# Patient Record
Sex: FEMALE | Race: WHITE
[De-identification: names, ages, dates, MRNs, and addresses within clinical notes are randomized per-mention and may not be internally consistent; named-entity substitution may affect disease eponyms.]

---

## 2017-09-12 ENCOUNTER — HOSPITAL ENCOUNTER (OUTPATIENT)
Dept: HOSPITAL 75 - RAD | Age: 67
End: 2017-09-12
Attending: NURSE PRACTITIONER
Payer: MEDICARE

## 2017-09-12 DIAGNOSIS — Z12.31: Primary | ICD-10-CM

## 2017-09-12 PROCEDURE — 77067 SCR MAMMO BI INCL CAD: CPT

## 2017-09-13 NOTE — DIAGNOSTIC IMAGING REPORT
EXAMINATION:

Bilateral screening mammogram 2D views with tomosynthesis. The

current study was also evaluated with a Computer Aided Detection

(CAD) system.



INDICATION: 

Screening.



PERSONAL HISTORY: 

No current complaints stated on the questionnaire.



COMPARISON: 

06/16/2016.



FINDINGS:

The breasts are composed of scattered fibroglandular densities.

There are scattered punctate calcifications seen. Allowing for

technique and positional differences, no suspicious change is

seen.



IMPRESSION: 

No significant change.



ACR BI-RADS Category 2: Benign findings.

Result letter will be mailed to the patient.

Note: At least 10% of breast cancer is not imaged by mammography.



 



Dictated by: 



  Dictated on workstation # [id number]

## 2017-11-15 ENCOUNTER — HOSPITAL ENCOUNTER (OUTPATIENT)
Dept: HOSPITAL 75 - RAD | Age: 67
End: 2017-11-15
Attending: NURSE PRACTITIONER
Payer: MEDICARE

## 2017-11-15 DIAGNOSIS — M17.11: Primary | ICD-10-CM

## 2017-11-15 PROCEDURE — 73562 X-RAY EXAM OF KNEE 3: CPT

## 2017-11-15 NOTE — DIAGNOSTIC IMAGING REPORT
EXAMINATION:

Three views of the right knee.



INDICATION:  

Right knee pain.



FINDINGS:

No fracture, dislocation, or radiopaque foreign body. There are

mild medial and patellofemoral compartment osteophytes. There is

subchondral stenosis and cyst formation at the patellofemoral

compartment also suggested. No effusion is seen.



IMPRESSION: 

Degenerative changes, most prominent in the patellofemoral

compartment.



Dictated by: 



  Dictated on workstation # [id number]

## 2018-01-18 ENCOUNTER — HOSPITAL ENCOUNTER (OUTPATIENT)
Dept: HOSPITAL 75 - REHAB | Age: 68
Discharge: HOME | End: 2018-01-18
Attending: FAMILY MEDICINE
Payer: MEDICARE

## 2018-01-18 DIAGNOSIS — M25.561: Primary | ICD-10-CM

## 2018-07-27 ENCOUNTER — HOSPITAL ENCOUNTER (OUTPATIENT)
Dept: HOSPITAL 75 - RAD | Age: 68
End: 2018-07-27
Attending: NURSE PRACTITIONER
Payer: MEDICARE

## 2018-07-27 DIAGNOSIS — G89.29: ICD-10-CM

## 2018-07-27 DIAGNOSIS — M25.562: Primary | ICD-10-CM

## 2018-07-27 PROCEDURE — 73562 X-RAY EXAM OF KNEE 3: CPT

## 2018-07-27 NOTE — DIAGNOSTIC IMAGING REPORT
INDICATION: Chronic knee pain of six months duration.



No priors.



FINDINGS: There is no fracture, dislocation or acute articular

incongruity. No acute periosteal reaction or bony destruction.



IMPRESSION: No acute-appearing abnormality.



Dictated by: 



  Dictated on workstation # [id number]

## 2018-08-03 ENCOUNTER — HOSPITAL ENCOUNTER (OUTPATIENT)
Dept: HOSPITAL 75 - RAD | Age: 68
End: 2018-08-03
Attending: NURSE PRACTITIONER
Payer: MEDICARE

## 2018-08-03 DIAGNOSIS — M71.22: Primary | ICD-10-CM

## 2018-08-03 PROCEDURE — 76881 US COMPL JOINT R-T W/IMG: CPT

## 2018-08-03 NOTE — DIAGNOSTIC IMAGING REPORT
INDICATION: Left knee pain.



FINDINGS: Sonographic interrogation of the popliteal fossa on the

left was performed. There is a complex Baker's cyst measuring 4.2

x 2.3 x 7.6 cm. No internal vascularity is seen. The popliteal

artery and vein are patent.



IMPRESSION: Complex Baker's cyst.



Dictated by: 



  Dictated on workstation # [id number]

## 2018-12-20 ENCOUNTER — HOSPITAL ENCOUNTER (OUTPATIENT)
Dept: HOSPITAL 75 - RAD | Age: 68
End: 2018-12-20
Attending: FAMILY MEDICINE
Payer: MEDICARE

## 2018-12-20 DIAGNOSIS — Z12.31: Primary | ICD-10-CM

## 2018-12-20 PROCEDURE — 77067 SCR MAMMO BI INCL CAD: CPT

## 2018-12-20 NOTE — DIAGNOSTIC IMAGING REPORT
INDICATION: Routine screening.



COMPARISON: Comparison is made with prior mammograms from

09/12/2017 and 06/16/2016.



TECHNIQUE: 2D and 3D bilateral screening mammography was

performed with computer-aided detection (CAD) system.



FINDINGS: Scattered fibroglandular densities are identified

bilaterally. No dominant mass or malignant-appearing

microcalcifications are seen. The axillae are unremarkable.



IMPRESSION: No mammographic features suspicious for malignancy

are identified.



ACR BI-RADS Category 1: Negative.

Result letter will be mailed to the patient.

Note:  At least 10% of breast cancer is not imaged by

mammography.



Dictated by: 



  Dictated on workstation # [id number]

## 2019-12-23 ENCOUNTER — HOSPITAL ENCOUNTER (OUTPATIENT)
Dept: HOSPITAL 75 - RAD | Age: 69
End: 2019-12-23
Attending: NURSE PRACTITIONER
Payer: MEDICARE

## 2019-12-23 DIAGNOSIS — Z12.31: Primary | ICD-10-CM

## 2019-12-23 PROCEDURE — 77067 SCR MAMMO BI INCL CAD: CPT

## 2019-12-23 NOTE — DIAGNOSTIC IMAGING REPORT
INDICATION: Routine screening.



Comparison is made with prior mammogram from 12/20/2018 and

9/12/2017.



2-D and 3-D bilateral screening mammography was performed with a

Computer Aided Detection (CAD) system. 3-D tomosynthesis was also

performed and reviewed.



FINDINGS: Scattered fibroglandular densities are identified

bilaterally. The parenchymal pattern is stable. No mass or

malignant appearing microcalcifications are seen. Axillae are

unremarkable.



IMPRESSION: No mammographic features suspicious for malignancy

are identified.



ACR BI-RADS Category 1: Negative.

Result letter will be mailed to the patient.

Note:  At least 10% of breast cancer is not imaged by

mammography.



Dictated by: 



  Dictated on workstation # [id number]

## 2020-06-25 ENCOUNTER — HOSPITAL ENCOUNTER (OUTPATIENT)
Dept: HOSPITAL 75 - RAD | Age: 70
End: 2020-06-25
Attending: NURSE PRACTITIONER
Payer: MEDICARE

## 2020-06-25 DIAGNOSIS — E04.2: Primary | ICD-10-CM

## 2020-06-25 PROCEDURE — 76536 US EXAM OF HEAD AND NECK: CPT

## 2020-06-25 NOTE — DIAGNOSTIC IMAGING REPORT
PROCEDURE: US Thyroid.



TECHNIQUE: Multiple real-time grayscale images were obtained of

the thyroid in various projections.



INDICATION:  Thyroid nodule



There are no prior thyroid ultrasound examinations available for

comparison. The nuclear medicine thyroid scan performed on

05/09/2012 failed to show any abnormality of the thyroid gland.



On this study the thyroid gland is not enlarged. The right lobe

measures 4.8 x 1.8 x 1.1 cm, while the left lobe is estimated to

be 4.8 x 1.5 x 1.6 cm. (Normal gland size 4/5 x 2.2 cm or less).

There is a 1.1 x 1.0 x 0.7 cm nodule of mixed echogenicity in the

midportion of the right lobe of the thyroid. I would rate this as

a TI-RADS 2 or perhaps TI-RADS 3. At any rate I do not feel it is

suspicious. Even so, as there are no prior studies available for

comparison, I would recommend that a short-term (6 month)

follow-up thyroid ultrasound exam be obtained for further study.



There is also predominantly solid 1.2 x 1.0 x 0.8 cm nodule in

the inferior pole of the left lobe. This does show some increased

vascularity. I would rate this as  a TI-RADS 3 as well. This

finding should also be further evaluated with a six-month

follow-up ultrasound exam.



There are a few other subcentimeter nodules in both lungs. These

are generally unremarkable. 



IMPRESSION:

1. The thyroid gland is not enlarged but there are small nodules

in both lobes. These nodules are probably TI-RADS 2 or TI-RADS  3

and unlikely to be related to malignancy. Even so, a short-term

(6 month) follow-up ultrasound exam would be recommended.



Dictated by: 



  Dictated on workstation # [id number]

## 2020-11-10 ENCOUNTER — HOSPITAL ENCOUNTER (OUTPATIENT)
Dept: HOSPITAL 75 - PREOP | Age: 70
Discharge: HOME | End: 2020-11-10
Attending: SPECIALIST
Payer: MEDICARE

## 2020-11-10 VITALS — HEIGHT: 66.02 IN | BODY MASS INDEX: 41.88 KG/M2 | WEIGHT: 260.59 LBS

## 2020-11-10 DIAGNOSIS — Z01.818: Primary | ICD-10-CM

## 2020-11-13 ENCOUNTER — HOSPITAL ENCOUNTER (OUTPATIENT)
Dept: HOSPITAL 75 - SDC | Age: 70
Discharge: HOME | End: 2020-11-13
Attending: SPECIALIST
Payer: MEDICARE

## 2020-11-13 VITALS — HEIGHT: 66.02 IN | BODY MASS INDEX: 41.88 KG/M2 | WEIGHT: 260.59 LBS

## 2020-11-13 VITALS — SYSTOLIC BLOOD PRESSURE: 135 MMHG | DIASTOLIC BLOOD PRESSURE: 75 MMHG

## 2020-11-13 VITALS — DIASTOLIC BLOOD PRESSURE: 75 MMHG | SYSTOLIC BLOOD PRESSURE: 135 MMHG

## 2020-11-13 DIAGNOSIS — E78.00: ICD-10-CM

## 2020-11-13 DIAGNOSIS — Z79.899: ICD-10-CM

## 2020-11-13 DIAGNOSIS — Z80.9: ICD-10-CM

## 2020-11-13 DIAGNOSIS — H35.30: ICD-10-CM

## 2020-11-13 DIAGNOSIS — Z90.710: ICD-10-CM

## 2020-11-13 DIAGNOSIS — H26.493: Primary | ICD-10-CM

## 2020-11-13 RX ADMIN — TETRACAINE HYDROCHLORIDE PRN ML: 5 SOLUTION OPHTHALMIC at 07:47

## 2020-11-13 RX ADMIN — TETRACAINE HYDROCHLORIDE PRN ML: 5 SOLUTION OPHTHALMIC at 07:35

## 2020-11-13 NOTE — OPHTHALMOLOGIST PRE-OP NOTE
Pre-Operative Progress Note


H&P Reviewed


The H&P was reviewed, patient examined and no changes noted.


Date H&P Reviewed:  Nov 13, 2020


Time H&P Reviewed:  07:08


Pre-Op Dx


Secondary Cataract, Bilateral Eyes











ANNA PEARSON MD             Nov 13, 2020 07:08

## 2020-11-13 NOTE — OPHTHALMOLOGY OPERATIVE REPORT
YAG Capsulotomy


PREOPERATIVE DIAGNOSIS:    Secondary Cataract Bilateral


POSTOPERATIVE DIAGNOSIS: Secondary Cataract Bilateral





PROCEDURE: YAG Capsulotomy, Bilateral





SURGEON: Vicente Pearson 





ANESTHESIA: Topical anesthesia





COMPLICATIONS: None





ESTIMATED BLOOD LOSS: Minimal 





DESCRIPTION OF PROCEDURE:


After proper informed consent was obtained, the patient's, a 69 female , 

received one drop of Tropicamide and one drop of Tetracaine in each eye. The 

patient was then placed at the YAG laser and using a power of [ 3.0] millijoules

and bursts  17[ ] right eye and [ 14] left eye were used to fashion a central 

capsulotomy. The patient tolerated the procedure well without complications.











VICENTE PEARSON MD             Nov 13, 2020 08:34

## 2020-12-22 ENCOUNTER — HOSPITAL ENCOUNTER (OUTPATIENT)
Dept: HOSPITAL 75 - RAD | Age: 70
End: 2020-12-22
Attending: FAMILY MEDICINE
Payer: MEDICARE

## 2020-12-22 DIAGNOSIS — N95.9: ICD-10-CM

## 2020-12-22 DIAGNOSIS — E04.2: Primary | ICD-10-CM

## 2020-12-22 PROCEDURE — 77080 DXA BONE DENSITY AXIAL: CPT

## 2020-12-22 PROCEDURE — 76536 US EXAM OF HEAD AND NECK: CPT

## 2020-12-22 NOTE — DIAGNOSTIC IMAGING REPORT
INDICATION: Postmenopausal screening



COMPARISON: Baseline



FINDINGS:



AP Spine L1-L4:  

[BMD (g/cm2): 1.501] [T-Score: 2.5] [Z-Score: 3.0]

[BMD Previous: NA] [BMD % Change: NA]



LT Hip Neck:       

[BMD (g/cm2): 0.953] [T-Score: -0.6] [Z-Score: 0.4]



LT Hip Total:       

[BMD (g/cm2):1.069] [T-Score:0.5] [Z-Score: 1.2]

[BMD Previous: NA] [BMD % Change: NA]



RT Hip Neck:      

[BMD (g/cm2):0.950] [T-Score:-0.6] [Z-Score:0.4]



RT Hip Total:      

[BMD (g/cm2):1.092] [T-score:0.7] [Z-Score:1.4]

[BMD Previous:NA] [BMD % Change:NA]



*Indicates significant change from prior examination based on 95%

confidence level.



World Health Organization criteria for BMD interpretation

classify patients as Normal (T-score at or above -1.0),

Osteopenic (T-score between -1.0 and -2.5) or Osteoporotic

(T-score at or below -2.5).



LIMITATIONS AND MODIFICATION:  None.



FRACTURE RISK (FRAX SCORE):

The ten year probability of (%): 

Major Osteoporotic Fracture: [NA]

Hip Fracture: [NA]



IMPRESSION:

1. Normal bone mineral density. 

2. Baseline examination.

3. See below National Osteoporosis Foundation guidelines on when

to potentially initiate pharmacologic therapy. 



Based on the National Osteoporosis Foundation Guidelines,

pharmacologic treatment should be initiated in any of the

following, unless clinical conditions suggest otherwise:



*  Any patient with prior fragility fracture of the hip or

vertebrae. A spine fracture indicates 5X risk for subsequent

spine fracture and 2X risk for subsequent hip fracture.



*  Osteoporosis (T-score <-2.5).



*  Postmenopausal women and men age 50 and older with low bone

mass/osteopenia (T-score between -1.0 and -2.5) by DXA and

10-year major osteoporotic fracture greater than 20% or a 10-year

probability of hip fracture greater than 3%. These fracture risks

are supplied above in the FRAX score, if applicable.



*  Clinician judgement and/or patient preferences may indicate

treatment for people with 10-year fracture probabilities above or

below these levels.



Dictated by: 



  Dictated on workstation # [id number]

## 2020-12-22 NOTE — DIAGNOSTIC IMAGING REPORT
PROCEDURE: US Thyroid.



TECHNIQUE: Multiple real-time grayscale images were obtained of

the thyroid in various projections.



INDICATION: Thyroid nodule.



COMPARISON: Thyroid ultrasound of 06/25/2020.



FINDINGS:



Right thyroid lobe: The right thyroid lobe measures 4.8 x 1.2 x

2.0 cm. There are few simple benign cysts within the right

thyroid lobe that are unchanged. There is also a mixed solid and

cystic nodule in the right thyroid lobe measuring 0.9 x 0.6 x 1.0

cm (previously 1.1 x 0.9 x 0.7 cm). There are no echogenic foci

and the nodule is wider than tall with smooth margins.



Isthmus: The thyroid isthmus measures 0.3 cm and is without

nodule. 



Left thyroid lobe: The left thyroid lobe measures 4.8 x 1.4 x 1.3

cm. There is a mixed solid and cystic nodule in the left thyroid

lobe measuring 0.9 x 0.9 x 0.8 cm. This is also wider than tall

and has no internal echogenic foci.



IMPRESSION:

1. Stable bilateral thyroid nodules which are only mildly

suspicious based on TI-RADS classification (TI-RADS 3). As

detailed below, given small size, follow-up thyroid ultrasound in

12 months is suggested to document stability.



TI-RADS Recommendations:TR3 - Mildly Suspicious. FNA if > 2.5 cm.

Follow if > 1.5 cm at  1, 3, 5 years. 



Dictated by: 



  Dictated on workstation # [id number]

## 2020-12-28 ENCOUNTER — HOSPITAL ENCOUNTER (OUTPATIENT)
Dept: HOSPITAL 75 - RAD | Age: 70
End: 2020-12-28
Attending: FAMILY MEDICINE
Payer: MEDICARE

## 2020-12-28 DIAGNOSIS — Z12.31: Primary | ICD-10-CM

## 2020-12-28 DIAGNOSIS — N95.9: ICD-10-CM

## 2020-12-28 DIAGNOSIS — E04.1: ICD-10-CM

## 2020-12-28 PROCEDURE — 77067 SCR MAMMO BI INCL CAD: CPT

## 2020-12-28 PROCEDURE — 77063 BREAST TOMOSYNTHESIS BI: CPT

## 2020-12-28 NOTE — DIAGNOSTIC IMAGING REPORT
INDICATION: 

Routine screening.



COMPARISON:    

12/23/2019 and 12/20/2018.



TECHNIQUE: 

2D and 3D bilateral screening mammography was performed with CAD.



FINDINGS:

Scattered fibroglandular densities are identified bilaterally.

The parenchymal pattern is stable. No mass or malignant appearing

microcalcifications are seen. The axillae are unremarkable.



IMPRESSION: 

No mammographic features suspicious for malignancy are

identified.



ACR BI-RADS Category 1: Negative.

Result letter will be mailed to the patient.

Note:  At least 10% of breast cancer is not imaged by

mammography.



Dictated by: 



  Dictated on workstation # [id number]

## 2021-06-25 ENCOUNTER — HOSPITAL ENCOUNTER (OUTPATIENT)
Dept: HOSPITAL 75 - PREOP | Age: 71
LOS: 3 days | Discharge: HOME | End: 2021-06-28
Attending: INTERNAL MEDICINE
Payer: MEDICARE

## 2021-06-25 VITALS — HEIGHT: 65.98 IN | WEIGHT: 262.13 LBS | BODY MASS INDEX: 42.13 KG/M2

## 2021-06-25 DIAGNOSIS — Z01.818: Primary | ICD-10-CM

## 2021-06-25 NOTE — HISTORY AND PHYSICAL
DATE OF SERVICE:  



COLONOSCOPY HISTORY AND PHYSICAL



HISTORY OF PRESENT ILLNESS:

The patient is a 70-year-old white female referred by Dr. Myers for screening

colonoscopy.  She is deemed to be of higher than average risk as her mother was

diagnosed with colon cancer in her 70s.  The patient last underwent colonoscopy

a little over 6-1/2 years ago, at which time she had three tubular adenomas

removed, one from the proximal sigmoid colon, one from the cecum and one from

the mid ascending colon.  She reports no change in health history.  No new

surgeries since I last saw her.  She has noted no bright red blood per rectum or

melena.  She has had no bowel habit change and feeling like she is in her usual

state of health.



PAST MEDICAL HISTORY:

Significant for hypertension and hyperlipidemia with no known history of

vascular disease.



PAST SURGICAL HISTORY:

Adenoidectomy and tonsillectomy as a child, hysterectomy, cholecystectomy and

appendectomy, none in the recent past.



SOCIAL HISTORY:

She is , retired in 2017.  No smoking or alcohol intake.



FAMILY HISTORY:

Other than mother with colon cancer.  Pertinent for father who succumbed to lung

cancer, also had hypertension and arthritis.  He has had one brother diagnosed

with prostate cancer in his 70s.



REVIEW OF SYSTEMS:

CONSTITUTIONAL:  The patient denies change in weight, night sweats, chills or

fever.

PULMONARY:  Denies cough, dyspnea on exertion or wheezing.

CARDIOVASCULAR:  Denies orthopnea, PND, pedal edema, chest discomfort, syncope

or presyncope.

GASTROINTESTINAL:  As noted in the HPI.



PHYSICAL EXAMINATION:

GENERAL:  Reveals a pleasant overweight white female, appears to be in no acute

distress.

VITAL SIGNS:  Weight 262 pounds, blood pressure 130/80.

HEENT:  Unremarkable.  Sclerae nonicteric.

CHEST:  Clear to auscultation.

CARDIOVASCULAR:  Reveals a regular rate and rhythm without significant murmur,

S3 or S4.

ABDOMEN:  Soft, supple without mass, organomegaly or tenderness.

EXTREMITIES:  Reveal no cyanosis, clubbing or edema.



ASSESSMENT AND PLAN:

The patient is set up for screening colonoscopy, deemed to be of higher than

average risk due to past history of polyps and family history for

colon cancer, index case being her mother.  Prep instructions with Suprep kit

were given and questions were answered.



Thank you for the referral of this pleasant lady.





Job ID: [id number]

DocumentID: [id number]

Dictated Date:  06/24/2021 14:19:24

Transcription Date: 06/24/2021 14:43:13

Dictated By: DARRIAN BAILEY MD

MTDD

## 2021-07-29 ENCOUNTER — HOSPITAL ENCOUNTER (OUTPATIENT)
Dept: HOSPITAL 75 - PREOP | Age: 71
Discharge: HOME | End: 2021-07-29
Attending: INTERNAL MEDICINE
Payer: MEDICARE

## 2021-07-29 VITALS — WEIGHT: 258.16 LBS | BODY MASS INDEX: 41.49 KG/M2 | HEIGHT: 65.98 IN

## 2021-07-29 DIAGNOSIS — Z01.818: Primary | ICD-10-CM

## 2021-08-06 ENCOUNTER — HOSPITAL ENCOUNTER (OUTPATIENT)
Dept: HOSPITAL 75 - ENDO | Age: 71
End: 2021-08-06
Attending: INTERNAL MEDICINE
Payer: MEDICARE

## 2021-08-06 VITALS — BODY MASS INDEX: 41.45 KG/M2 | HEIGHT: 65.98 IN | WEIGHT: 257.94 LBS

## 2021-08-06 VITALS — DIASTOLIC BLOOD PRESSURE: 73 MMHG | SYSTOLIC BLOOD PRESSURE: 145 MMHG

## 2021-08-06 VITALS — DIASTOLIC BLOOD PRESSURE: 75 MMHG | SYSTOLIC BLOOD PRESSURE: 143 MMHG

## 2021-08-06 VITALS — SYSTOLIC BLOOD PRESSURE: 149 MMHG | DIASTOLIC BLOOD PRESSURE: 68 MMHG

## 2021-08-06 VITALS — SYSTOLIC BLOOD PRESSURE: 141 MMHG | DIASTOLIC BLOOD PRESSURE: 74 MMHG

## 2021-08-06 DIAGNOSIS — E78.5: ICD-10-CM

## 2021-08-06 DIAGNOSIS — Z79.899: ICD-10-CM

## 2021-08-06 DIAGNOSIS — Z12.11: Primary | ICD-10-CM

## 2021-08-06 DIAGNOSIS — E66.01: ICD-10-CM

## 2021-08-06 DIAGNOSIS — K21.9: ICD-10-CM

## 2021-08-06 DIAGNOSIS — D12.3: ICD-10-CM

## 2021-08-06 DIAGNOSIS — I10: ICD-10-CM

## 2021-08-06 PROCEDURE — 88305 TISSUE EXAM BY PATHOLOGIST: CPT

## 2021-08-06 RX ADMIN — SODIUM CHLORIDE, SODIUM LACTATE, POTASSIUM CHLORIDE, AND CALCIUM CHLORIDE STA MLS/HR: 600; 310; 30; 20 INJECTION, SOLUTION INTRAVENOUS at 08:08

## 2021-08-06 RX ADMIN — SODIUM CHLORIDE, SODIUM LACTATE, POTASSIUM CHLORIDE, AND CALCIUM CHLORIDE STA MLS/HR: 600; 310; 30; 20 INJECTION, SOLUTION INTRAVENOUS at 08:17

## 2021-08-06 NOTE — ANESTHESIA-GENERAL POST-OP
MAC


Patient Condition


Mental Status/LOC:  Same as Preop


Cardiovascular:  Satisfactory


Nausea/Vomiting:  Absent


Respiratory:  Satisfactory


Pain:  Controlled


Complications:  Absent





Post Op Complications


Complications


None





Follow Up Care/Instructions


Patient Instructions


None needed.





Anesthesiology Discharge Order


Discharge Order


Patient is doing well, no complaints, stable vital signs, no apparent adverse 

anesthesia problems.   


No complications reported per nursing.











GT BARROSO CRNA           Aug 6, 2021 09:43

## 2021-08-06 NOTE — OPERATIVE REPORT
DATE OF SERVICE:  



COLONOSCOPY SUMMARY



INDICATION FOR THE PROCEDURE:

Screening colonoscopy.



DESCRIPTION OF PROCEDURE:

The patient was placed in the left lateral decubitus position.  Prior to

undergoing colonoscopy, digital rectal evaluation was performed.  Anal sphincter

tone was normal and the perianal reflexes intact.  No abnormalities were noted

on digital inspection of anal canal or distal rectal vault.  The colonoscope was

then inserted into the rectum and under direct visualization advanced to the

cecum.  The cecum was identified by identification of ileocecal valve and cecal

strap.  Photographic documentation was obtained.  A careful inspection was made

as the colonoscope was withdrawn.  Quality of prep was good.



FINDINGS:

There was no evidence for internal or external hemorrhoids.  The rectum, sigmoid

colon, descending colon, splenic flexure and transverse colon were unremarkable.

 One 3 mm sessile polyp was noted at the hepatic flexure.  It was biopsy removed

via cold forceps and submitted for histopathology with no blood loss.  Present

in the mid ascending colon was blush lesions compatible with vascular

malformation, no evidence for blood was noted in the colon.  The remainder of

the ascending colon and cecum was unremarkable.



ASSESSMENT:

One diminutive 3 mm polyp was removed noted at hepatic flexure with no

subsequent blood loss.  There was one small lesion compatible with vascular

malformation noted in the mid ascending colon.  This was an otherwise normal

colonoscopy to the cecum under good prep conditions.  As long as there are no

surprise on histopathology report and due to family history would advocate

consideration for repeat screening colonoscopy in 5 years.



I thank you for the referral of this pleasant lady.





Job ID: [id number]

DocumentID: [id number]

Dictated Date:  08/06/2021 09:44:04

Transcription Date: 08/06/2021 14:01:21

Dictated By: DARRIAN BAILEY MD

## 2021-08-06 NOTE — PRE-OP NOTE & CONSCIOUS SEDAT
Pre-Operative Progress Note


H&P Reviewed


The H&P was reviewed, patient examined and no changes noted.


Date H&P Reviewed:  Aug 6, 2021


Time H&P Reviewed:  08:25





Conscious Sedation Pre-Proced


ASA Score


2


For ASA 3 and 4: Consider anesthesia and medical clearance. Also, for patients

with a history of failed moderate sedation consider anesthesia.

















Airway 


 


Lungs 


 


Heart 


 


 ASA score


 


 ASA 1: a normal healthy patient


 


 ASA 2:  a patient with a mild systemic disease (mid diabetes, controlled 

hypertension, obesity 


 


 ASA 3:  a patient with a severe systemic disease that limits activity  (angina,

COPD, prior Myocardial infarction)


 


 ASA 4:  a patient with an incapacitating disease that is a constant threat to 

life (CHF, renal failure)


 


 ASA 5:  a moribund patient not expected to survive 24 hrs.  (ruptured aneurysm)


 


 ASA 6:  a declared brain-dead patient whose organs are being harvested.


 


 For emergent operations, add the letter E after the classification











Mallampati Classification


Grade 2





Sedation Plan


Analgesia, Amnesia, Plan communicated to team members, Discussed options with 

patient/fam, Discussed risks with patient/fam


The patient is an appropriate candidate to undergo the planned procedure, 

sedation, and anesthesia.





The patient immediately re-assessed prior to indication.











DARRIAN BAILEY MD               Aug 6, 2021 08:25

## 2021-08-06 NOTE — PRE-OP NOTE & CONSCIOUS SEDAT
Pre-Operative Progress Note


H&P Reviewed


The H&P was reviewed, patient examined and no changes noted.


Date H&P Reviewed:  Aug 6, 2021


Time H&P Reviewed:  08:25





Conscious Sedation Pre-Proced


Time


08:25





ASA Score


2


For ASA 3 and 4: Consider anesthesia and medical clearance. Also, for patients

with a history of failed moderate sedation consider anesthesia.

















Airway 


 


Lungs 


 


Heart 


 


 ASA score


 


 ASA 1: a normal healthy patient


 


 ASA 2:  a patient with a mild systemic disease (mid diabetes, controlled 

hypertension, obesity 


 


 ASA 3:  a patient with a severe systemic disease that limits activity  (angina,

COPD, prior Myocardial infarction)


 


 ASA 4:  a patient with an incapacitating disease that is a constant threat to 

life (CHF, renal failure)


 


 ASA 5:  a moribund patient not expected to survive 24 hrs.  (ruptured aneurysm)


 


 ASA 6:  a declared brain-dead patient whose organs are being harvested.


 


 For emergent operations, add the letter E after the classification











Mallampati Classification


Grade 2





Sedation Plan


Analgesia, Amnesia, Plan communicated to team members, Discussed options with 

patient/fam, Discussed risks with patient/fam


The patient is an appropriate candidate to undergo the planned procedure, 

sedation, and anesthesia.





The patient immediately re-assessed prior to indication.











DARRIAN BAILEY MD               Aug 6, 2021 08:26

## 2021-12-31 ENCOUNTER — HOSPITAL ENCOUNTER (OUTPATIENT)
Dept: HOSPITAL 75 - RAD | Age: 71
End: 2021-12-31
Attending: NURSE PRACTITIONER
Payer: MEDICARE

## 2021-12-31 DIAGNOSIS — Z12.31: Primary | ICD-10-CM

## 2021-12-31 DIAGNOSIS — E04.2: ICD-10-CM

## 2021-12-31 PROCEDURE — 77063 BREAST TOMOSYNTHESIS BI: CPT

## 2021-12-31 PROCEDURE — 77067 SCR MAMMO BI INCL CAD: CPT

## 2021-12-31 PROCEDURE — 76536 US EXAM OF HEAD AND NECK: CPT

## 2021-12-31 NOTE — DIAGNOSTIC IMAGING REPORT
Digital mammogram bilateral screening.



COMPARISON: This study was compared to the prior exams of

12/28/2020, 12/23/2019 and 12/20/2018. 



At this time, there are no current complaints. 



The current study was also evaluated with a Computer Aided

Detection (CAD) system.



FINDINGS: There is a mild amount of fibroglandular tissue present

in both breasts, similar to the prior exam. No primary or

secondary sign of malignancy is noted.



IMPRESSION: There is no radiographic evidence for malignancy.



ACR category 1 



ACR BI-RADS Category 1: Negative.

Result letter will be mailed to the patient.

Note:  At least 10% of breast cancer is not imaged by

mammography.



Dictated by: 



  Dictated on workstation # [id number]

## 2021-12-31 NOTE — DIAGNOSTIC IMAGING REPORT
PROCEDURE: US Thyroid.



TECHNIQUE: Multiple real-time grayscale images were obtained of

the thyroid in various projections.



INDICATION:  Bilateral thyroid nodules.



The prior thyroid ultrasound exam performed on 12/22/2020 noted

stable nodules involving both lobes of the thyroid. These nodules

were felt to be a TI-RADS 3. On this study, those hypoechoic

nodules are again identified and do not appear to have changed

significantly. The largest of these nodules is in the left lobe

and measures approximately 1.2 x 1.1 x 0.7 cm. This appears to be

predominantly solid.



There is also a 1.1 x 1.0 x 0.6 cm nodule of mixed echogenicity

in the right lobe. A smaller nodule is also seen in the right

lobe.



The thyroid gland itself is prominent but not enlarged. The right

lobe measures 4.9 x 2.2 x 1.2 cm while the left lobe is estimated

to be 4.6 x 1.7 x 1.4 cm (normal gland size 4-5 x 2 x 2 cm or

less).



IMPRESSION:

1. The overall appearance of the thyroid gland has not changed

significantly since the prior exam. The nodules in both lobes

seen previously appear stable.

2. If further study is desired, then a follow-up ultrasound exam

in one year would be recommended.



Dictated by: 



  Dictated on workstation # [id number]

## 2023-01-11 ENCOUNTER — HOSPITAL ENCOUNTER (OUTPATIENT)
Dept: HOSPITAL 75 - RAD | Age: 73
End: 2023-01-11
Attending: NURSE PRACTITIONER
Payer: MEDICARE

## 2023-01-11 ENCOUNTER — HOSPITAL ENCOUNTER (OUTPATIENT)
Dept: HOSPITAL 75 - CARD | Age: 73
End: 2023-01-11
Attending: INTERNAL MEDICINE
Payer: MEDICARE

## 2023-01-11 DIAGNOSIS — Z12.31: Primary | ICD-10-CM

## 2023-01-11 DIAGNOSIS — I10: Primary | ICD-10-CM

## 2023-01-11 DIAGNOSIS — I25.10: ICD-10-CM

## 2023-01-11 PROCEDURE — 77063 BREAST TOMOSYNTHESIS BI: CPT

## 2023-01-11 PROCEDURE — 77067 SCR MAMMO BI INCL CAD: CPT

## 2023-01-11 PROCEDURE — 93306 TTE W/DOPPLER COMPLETE: CPT

## 2023-01-11 NOTE — DIAGNOSTIC IMAGING REPORT
INDICATION: Routine screening.



COMPARISON: Prior mammograms from 12/31/2021 and 12/28/2020.



EXAMINATION: 2D and 3D bilateral screening mammography was

performed with CAD. 



The current study was also evaluated with a Computer Aided

Detection (CAD) system.



FINDINGS: Scattered fibroglandular densities are identified,

bilaterally. No mass or malignant-appearing microcalcification is

seen. Axillae are unremarkable.



IMPRESSION: No mammographic features suspicious for malignancy

are identified. 



ACR BI-RADS Category 1: Negative.

Result letter will be mailed to the patient.

Note:  At least 10% of breast cancer is not imaged by

mammography.



Dictated by: 



  Dictated on workstation # [id number]

## 2023-01-26 ENCOUNTER — HOSPITAL ENCOUNTER (EMERGENCY)
Dept: HOSPITAL 75 - ER | Age: 73
Discharge: HOME | End: 2023-01-26
Payer: MEDICARE

## 2023-01-26 VITALS — BODY MASS INDEX: 42.27 KG/M2 | HEIGHT: 66.02 IN | WEIGHT: 263.01 LBS

## 2023-01-26 VITALS — DIASTOLIC BLOOD PRESSURE: 82 MMHG | SYSTOLIC BLOOD PRESSURE: 135 MMHG

## 2023-01-26 DIAGNOSIS — R00.2: Primary | ICD-10-CM

## 2023-01-26 DIAGNOSIS — I10: ICD-10-CM

## 2023-01-26 LAB
ALBUMIN SERPL-MCNC: 4.4 GM/DL (ref 3.2–4.5)
ALP SERPL-CCNC: 72 U/L (ref 40–136)
ALT SERPL-CCNC: 20 U/L (ref 0–55)
APTT PPP: YELLOW S
BACTERIA #/AREA URNS HPF: NEGATIVE /HPF
BARBITURATES UR QL: NEGATIVE
BASOPHILS # BLD AUTO: 0.1 10^3/UL (ref 0–0.1)
BASOPHILS NFR BLD AUTO: 1 % (ref 0–10)
BENZODIAZ UR QL SCN: NEGATIVE
BILIRUB SERPL-MCNC: 0.4 MG/DL (ref 0.1–1)
BILIRUB UR QL STRIP: NEGATIVE
BUN/CREAT SERPL: 19
CALCIUM SERPL-MCNC: 9.8 MG/DL (ref 8.5–10.1)
CHLORIDE SERPL-SCNC: 108 MMOL/L (ref 98–107)
CO2 SERPL-SCNC: 23 MMOL/L (ref 21–32)
COCAINE UR QL: NEGATIVE
CREAT SERPL-MCNC: 0.84 MG/DL (ref 0.6–1.3)
EOSINOPHIL # BLD AUTO: 0.2 10^3/UL (ref 0–0.3)
EOSINOPHIL NFR BLD AUTO: 3 % (ref 0–10)
FIBRINOGEN PPP-MCNC: CLEAR MG/DL
GFR SERPLBLD BASED ON 1.73 SQ M-ARVRAT: 73 ML/MIN
GLUCOSE SERPL-MCNC: 111 MG/DL (ref 70–105)
GLUCOSE UR STRIP-MCNC: NEGATIVE MG/DL
HCT VFR BLD CALC: 40 % (ref 35–52)
HGB BLD-MCNC: 13.5 G/DL (ref 11.5–16)
KETONES UR QL STRIP: NEGATIVE
LEUKOCYTE ESTERASE UR QL STRIP: (no result)
LYMPHOCYTES # BLD AUTO: 1.2 10^3/UL (ref 1–4)
LYMPHOCYTES NFR BLD AUTO: 17 % (ref 12–44)
MAGNESIUM SERPL-MCNC: 2.1 MG/DL (ref 1.6–2.4)
MANUAL DIFFERENTIAL PERFORMED BLD QL: NO
MCH RBC QN AUTO: 29 PG (ref 25–34)
MCHC RBC AUTO-ENTMCNC: 33 G/DL (ref 32–36)
MCV RBC AUTO: 87 FL (ref 80–99)
METHADONE UR QL SCN: NEGATIVE
MONOCYTES # BLD AUTO: 0.9 10^3/UL (ref 0–1)
MONOCYTES NFR BLD AUTO: 14 % (ref 0–12)
NEUTROPHILS # BLD AUTO: 4.5 10^3/UL (ref 1.8–7.8)
NEUTROPHILS NFR BLD AUTO: 66 % (ref 42–75)
NITRITE UR QL STRIP: NEGATIVE
OPIATES UR QL SCN: NEGATIVE
OXYCODONE UR QL: NEGATIVE
PH UR STRIP: 5.5 [PH] (ref 5–9)
PLATELET # BLD: 247 10^3/UL (ref 130–400)
PMV BLD AUTO: 10.7 FL (ref 9–12.2)
POTASSIUM SERPL-SCNC: 4 MMOL/L (ref 3.6–5)
PROPOXYPH UR QL: NEGATIVE
PROT SERPL-MCNC: 7.2 GM/DL (ref 6.4–8.2)
PROT UR QL STRIP: NEGATIVE
RBC #/AREA URNS HPF: (no result) /HPF
SODIUM SERPL-SCNC: 140 MMOL/L (ref 135–145)
SP GR UR STRIP: <=1.005 (ref 1.02–1.02)
SQUAMOUS #/AREA URNS HPF: (no result) /HPF
TRICYCLICS UR QL SCN: NEGATIVE
WBC # BLD AUTO: 6.9 10^3/UL (ref 4.3–11)
WBC #/AREA URNS HPF: (no result) /HPF

## 2023-01-26 PROCEDURE — 84484 ASSAY OF TROPONIN QUANT: CPT

## 2023-01-26 PROCEDURE — 85025 COMPLETE CBC W/AUTO DIFF WBC: CPT

## 2023-01-26 PROCEDURE — 80306 DRUG TEST PRSMV INSTRMNT: CPT

## 2023-01-26 PROCEDURE — 71045 X-RAY EXAM CHEST 1 VIEW: CPT

## 2023-01-26 PROCEDURE — 81000 URINALYSIS NONAUTO W/SCOPE: CPT

## 2023-01-26 PROCEDURE — 93005 ELECTROCARDIOGRAM TRACING: CPT

## 2023-01-26 PROCEDURE — 36415 COLL VENOUS BLD VENIPUNCTURE: CPT

## 2023-01-26 PROCEDURE — 80053 COMPREHEN METABOLIC PANEL: CPT

## 2023-01-26 PROCEDURE — 83735 ASSAY OF MAGNESIUM: CPT

## 2023-01-26 NOTE — DIAGNOSTIC IMAGING REPORT
EXAMINATION: Chest 1 view



HISTORY: palpitations



COMPARISON: None available.



FINDINGS: 



Heart size and pulmonary vasculature are normal. The lungs are

clear without consolidation, pleural effusion, or pneumothorax.

Degenerative changes of the thoracic spine. Osseous structures

are otherwise intact.



IMPRESSION: 



1. No acute radiographic abnormality in the chest.



Dictated by: 



  Dictated on workstation # [id number]

## 2023-01-26 NOTE — ED CARDIAC GENERAL
History of Present Illness


General


Stated Complaint:  IRREGULAR HEARTBEAT | HIGH BLOOD PRESSURE





History of Present Illness


Date Seen by Provider:  2023


Time Seen by Provider:  09:55


Initial Comments


72-year-old female with PMH of hypertension HLD, is here with complaints of 

feeling her heart racing with palpitations today morning.  Patient stated that 

her watch showed that her heart rate went elevated into the 140s.  Patient is 

only had this occurred once last November, and she has had a full cardiology 

work-up done in 2023 with Dr. Moise.  Patient stated that everything 

came back normal for her cardiac work-up.  Patient also noticed at the same time

that her heart rate increased, that her blood pressure was also high.  Patient 

normally takes losartan in the morning and metoprolol at night, and patient has 

taken her morning dose today.  Denies recent URI, fever, abdominal pain, nausea 

and vomiting, diaphoresis, diarrhea, constipation.





Allergies and Home Medications


Allergies


Coded Allergies:  


     No Known Drug Allergies (Unverified , 1/13/15)





Patient Home Medication List


Home Medication List Reviewed:  Yes


Aspirin (Aspirin EC) 81 Mg Tablet., 81 MG PO DAILY, (Reported)


   Entered as Reported by: JULIE A IBEH on 21 0811


Gluc/Oswaldo-MSM#2/C/D3/Haroon/Born (Glucosamin-Chondroitin-MSM Tab) 1 Each Tablet, 1

EACH PO DAILY, (Reported)


   Entered as Reported by: JULIE A IBEH on 21 08


Ibuprofen (Ibuprofen) 800 Mg Tablet, 800 MG PO HS, (Reported)


   Entered as Reported by: KENDY LIZAMA on 11/10/20 1341


Losartan Potassium (Losartan Potassium) 50 Mg Tablet, 50 MG PO DAILY, (Reported)


   Entered as Reported by: KENDY LIZAMA on 11/10/20 1341


Mv-Mn/Iron/FA/Herbal Cmplx#190 (Vitamin D3 Complete Caplet) 1 Each Tablet, 1 

EACH PO DAILY, (Reported)


   Entered as Reported by: JULIE A IBEH on 21 0811


Omega-3 Fatty Acids/Fish Oil (Omega 3 1,000 mg Softgel) 1 Each Capsule, 1 EACH 

PO DAILY, (Reported)


   Entered as Reported by: JULIE A IBEH on 21 08


Omeprazole (Omeprazole) 20 Mg Tablet.dr, 20 MG PO DAILY, (Reported)


   Entered as Reported by: JULIE A IBEH on 21 0811


Pravastatin Sodium (Pravastatin Sodium) 20 Mg Tablet, 20 MG PO HS, (Reported)


   Entered as Reported by: KENDY LIZAMA on 11/10/20 1341


Ubidecarenone (Coq-10) 30 Mg Capsule, 30 MG PO DAILY, (Reported)


   Entered as Reported by: JULIE A IBEH on 21 0811


Vit A/C/E/Zinc/Co (Preservision Areds Softgel) 1 Cap Capsule, 1 CAP PO DAILY, 

(Reported)


   Entered as Reported by: JULIE A IBEH on 21 0811





Review of Systems


Review of Systems


Constitutional:  no symptoms reported


EENTM:  No Symptoms Reported


Respiratory:  No Symptoms Reported


Cardiovascular:  Palpitations


Gastrointestinal:  No Symptoms Reported


Genitourinary:  No Symptoms Reported


Musculoskeletal:  no symptoms reported


Skin:  no symptoms reported


Psychiatric/Neurological:  No Symptoms Reported


Endocrine:  No Symptoms Reported


Hematologic/Lymphatic:  No Symptoms Reported





Past Medical-Social-Family Hx


Seasonal Allergies


Seasonal Allergies:  No





Past Medical History


Surgeries:  Yes


Gallbladder, Hysterectomy, Tonsillectomy


Respiratory:  No


Cardiac:  Yes


Hypertension


Neurological:  No


Genitourinary:  No


Gastrointestinal:  No


Musculoskeletal:  No


Endocrine:  No


HEENT:  No


Cancer:  No


Psychosocial:  No


Integumentary:  No


Blood Disorders:  No





Physical Exam


Vital Signs





Vital Signs - First Documented








 23





 09:54


 


Temp 36.2


 


Pulse 107


 


Resp 19


 


B/P (MAP) 173/92 (119)


 


O2 Delivery Room Air





Capillary Refill :


Height, Weight, BMI


Height: 5'6.00"


Weight: 260lbs. oz. 117.644862uk; 41.65 BMI


Method:


General Appearance:  No Apparent Distress, WD/WN, Anxious


HEENT:  PERRL/EOMI


Neck:  Full Range of Motion, Normal Inspection


Respiratory:  Chest Non Tender, Lungs Clear, Normal Breath Sounds, No Accessory 

Muscle Use


Cardiovascular:  Regular Rate, Rhythm, No Edema, No Murmur, Normal Peripheral 

Pulses


Gastrointestinal:  Normal Bowel Sounds, Non Tender, Soft


Neurologic/Psychiatric:  Alert, Oriented x3, No Motor/Sensory Deficits


Skin:  Normal Color


Lymphatic:  No Adenopathy





Progress/Results/Core Measures


Results/Orders


Lab Results





Laboratory Tests








Test


 23


10:15 23


11:11 Range/Units


 


 


White Blood Count


 6.9 


 


 4.3-11.0


10^3/uL


 


Red Blood Count


 4.62 


 


 3.80-5.11


10^6/uL


 


Hemoglobin 13.5   11.5-16.0  g/dL


 


Hematocrit 40   35-52  %


 


Mean Corpuscular Volume 87   80-99  fL


 


Mean Corpuscular Hemoglobin 29   25-34  pg


 


Mean Corpuscular Hemoglobin


Concent 33 


 


 32-36  g/dL





 


Red Cell Distribution Width 14.6 H  10.0-14.5  %


 


Platelet Count


 247 


 


 130-400


10^3/uL


 


Mean Platelet Volume 10.7   9.0-12.2  fL


 


Immature Granulocyte % (Auto) 0    %


 


Neutrophils (%) (Auto) 66   42-75  %


 


Lymphocytes (%) (Auto) 17   12-44  %


 


Monocytes (%) (Auto) 14 H  0-12  %


 


Eosinophils (%) (Auto) 3   0-10  %


 


Basophils (%) (Auto) 1   0-10  %


 


Neutrophils # (Auto)


 4.5 


 


 1.8-7.8


10^3/uL


 


Lymphocytes # (Auto)


 1.2 


 


 1.0-4.0


10^3/uL


 


Monocytes # (Auto)


 0.9 


 


 0.0-1.0


10^3/uL


 


Eosinophils # (Auto)


 0.2 


 


 0.0-0.3


10^3/uL


 


Basophils # (Auto)


 0.1 


 


 0.0-0.1


10^3/uL


 


Immature Granulocyte # (Auto)


 0.0 


 


 0.0-0.1


10^3/uL


 


Sodium Level 140   135-145  MMOL/L


 


Potassium Level 4.0   3.6-5.0  MMOL/L


 


Chloride Level 108 H    MMOL/L


 


Carbon Dioxide Level 23   21-32  MMOL/L


 


Anion Gap 9   5-14  MMOL/L


 


Blood Urea Nitrogen 16   7-18  MG/DL


 


Creatinine


 0.84 


 


 0.60-1.30


MG/DL


 


Estimat Glomerular Filtration


Rate 73 


 


  





 


BUN/Creatinine Ratio 19    


 


Glucose Level 111 H    MG/DL


 


Calcium Level 9.8   8.5-10.1  MG/DL


 


Corrected Calcium 9.5   8.5-10.1  MG/DL


 


Magnesium Level 2.1   1.6-2.4  MG/DL


 


Total Bilirubin 0.4   0.1-1.0  MG/DL


 


Aspartate Amino Transf


(AST/SGOT) 18 


 


 5-34  U/L





 


Alanine Aminotransferase


(ALT/SGPT) 20 


 


 0-55  U/L





 


Alkaline Phosphatase 72     U/L


 


Troponin I < 0.028   <0.028  NG/ML


 


Total Protein 7.2   6.4-8.2  GM/DL


 


Albumin 4.4   3.2-4.5  GM/DL


 


Urine Color  YELLOW   


 


Urine Clarity  CLEAR   


 


Urine pH  5.5  5-9  


 


Urine Specific Gravity  <=1.005  1.016-1.022  


 


Urine Protein  NEGATIVE  NEGATIVE  


 


Urine Glucose (UA)  NEGATIVE  NEGATIVE  


 


Urine Ketones  NEGATIVE  NEGATIVE  


 


Urine Nitrite  NEGATIVE  NEGATIVE  


 


Urine Bilirubin  NEGATIVE  NEGATIVE  


 


Urine Urobilinogen  0.2  < = 1.0  MG/DL


 


Urine Leukocyte Esterase  1+ H NEGATIVE  


 


Urine RBC (Auto)  NEGATIVE  NEGATIVE  


 


Urine RBC  NONE   /HPF


 


Urine WBC  2-5   /HPF


 


Urine Squamous Epithelial


Cells 


 RARE 


  /HPF





 


Urine Crystals  NONE   /LPF


 


Urine Bacteria  NEGATIVE   /HPF


 


Urine Casts  NONE   /LPF


 


Urine Mucus  NEGATIVE   /LPF


 


Urine Culture Indicated  NO   


 


Urine Opiates Screen  NEGATIVE  NEGATIVE  


 


Urine Oxycodone Screen  NEGATIVE  NEGATIVE  


 


Urine Methadone Screen  NEGATIVE  NEGATIVE  


 


Urine Propoxyphene Screen  NEGATIVE  NEGATIVE  


 


Urine Barbiturates Screen  NEGATIVE  NEGATIVE  


 


Ur Tricyclic Antidepressants


Screen 


 NEGATIVE 


 NEGATIVE  





 


Urine Phencyclidine Screen  NEGATIVE  NEGATIVE  


 


Urine Amphetamines Screen  NEGATIVE  NEGATIVE  


 


Urine Methamphetamines Screen  NEGATIVE  NEGATIVE  


 


Urine Benzodiazepines Screen  NEGATIVE  NEGATIVE  


 


Urine Cocaine Screen  NEGATIVE  NEGATIVE  


 


Urine Cannabinoids Screen  NEGATIVE  NEGATIVE  








My Orders





Orders - ROSA KEN MD


Ekg Tracing (23 10:01)


Continuous Ekg Monitoring (23 10:01)


Cbc With Automated Diff (23 11:04)


Comprehensive Metabolic Panel (23 11:04)


Drug Screen Stat (Urine) (23 11:04)


Magnesium (23 11:04)


Ua Culture If Indicated (23 11:04)


Troponin I Twiggs (23 11:04)


Chest 1 View, Ap/Pa Only (23 11:04)


Aspirin Chewable Tablet (Baby Aspirin Ch (23 11:15)





Medications Given in ED





Current Medications








 Medications  Dose


 Ordered  Sig/Divine


 Route  Start Time


 Stop Time Status Last Admin


Dose Admin


 


 Aspirin  324 mg  ONCE  ONCE


 PO  23 11:15


 23 11:16 DC 23 11:43


324 MG








Vital Signs/I&O











 1/26/23





 09:54


 


Temp 36.2


 


Pulse 107


 


Resp 19


 


B/P (MAP) 173/92 (119)


 


O2 Delivery Room Air











Progress


Progress Note :  


Progress Note


1. PALPITATIONS/ ELEVATED BLOOD PRESSURE:


- CXR: no acute findings


- Troponin/ EKG: non-ischemic


- Labs unremarkable


- UA/ UDS: Negative


- ASA 324mg given


- BP came down from 199/ 110 to 159/88


- ACS was ruled out, patient took her medication in the morning and her blood 

pressure was likely high because the medication had not kicked in until she was 

in the ER. Cardiac monitoring in the ER did not show any abnormal rhythm


- Discussed with Dr. Moise's office, and they will order a Zio monitor and pt 

will go directly to the cardiology clinic to have this placed


-  Follow up with cardiology and PCP in the next 7 days with BP log


-The patient was seen in the ED, and treated appropriately to presentation at a 

specific point in time. Patient is informed that there is a possibility that 

disease and illness can evolve and change in acuity rapidly or slowly after 

patient is discharged from the ER. Precautionary advice given to the patient for

immediate return to ER if symptoms worsen or do not resolve, and to seek 

emergency care sooner rather than later. Pt also advised on the importance of 

PCP follow up and compliance with management and follow up plan with PCP and/or 

specialist, as this is part of the management plan. Pt verbally expressed 

understanding.





Diagnostic Imaging





   Diagonstic Imaging:  Xray


   Plain Films/CT/US/NM/MRI:  chest


Comments


                 [facility].


                                [place], [place]





NAME:   SHANTEL NARANJO


[facility] REC#:   [id number]


ACCOUNT#:   [id number]


PT STATUS:   REG ER


:   1950


PHYSICIAN:   ROSA KEN MD


ADMIT DATE:   23/ER


                                   ***Draft***


Date of Exam:23





CHEST 1 VIEW, AP/PA ONLY








EXAMINATION: Chest 1 view





HISTORY: palpitations





COMPARISON: None available.





FINDINGS: 





Heart size and pulmonary vasculature are normal. The lungs are


clear without consolidation, pleural effusion, or pneumothorax.


Degenerative changes of the thoracic spine. Osseous structures


are otherwise intact.





IMPRESSION: 





1. No acute radiographic abnormality in the chest.





  Dictated on workstation # [id number]








Dict:   23 [id number]


Trans:   23 1126


AS6 [id number]





Interpreted by:     KAMLA STEPHEN DO


Electronically signed by:





Departure


Impression





   Primary Impression:  


   Palpitations


   Additional Impression:  


   Elevated blood pressure reading with diagnosis of hypertension


Disposition:   HOME, SELF-CARE


Condition:  Improved





Departure-Patient Inst.


Referrals:  


EMELYN OSORIO MD (PCP/Family)


Primary Care Physician


Patient Instructions:  High Blood Pressure ED, Palpitations ED, Controlling Your

Blood Pressure Through Lifestyle





Add. Discharge Instructions:  


-  Dr. Moise's office  will order a Zio monitor and pt will go directly to the 

cardiology clinic to have this placed


-  Follow up with cardiology and PCP in the next 7 days with BP log


-Patient is informed that there is a possibility that disease and illness can 

evolve and change in acuity rapidly or slowly after patient is discharged from 

the ER. Precautionary advice given to the patient for immediate return to ER if 

symptoms worsen or do not resolve, and to seek emergency care sooner rather than

later. Pt also advised on the importance of PCP follow up and compliance with 

management and follow up plan with PCP and/or specialist, as this is part of the

management plan. Pt verbally expressed understanding.











ROSA KEN MD              2023 09:55

## 2023-02-13 ENCOUNTER — HOSPITAL ENCOUNTER (OUTPATIENT)
Dept: HOSPITAL 75 - CARD | Age: 73
End: 2023-02-13
Attending: INTERNAL MEDICINE
Payer: MEDICARE

## 2023-02-13 VITALS — DIASTOLIC BLOOD PRESSURE: 45 MMHG | SYSTOLIC BLOOD PRESSURE: 124 MMHG

## 2023-02-13 DIAGNOSIS — I10: ICD-10-CM

## 2023-02-13 DIAGNOSIS — I25.89: Primary | ICD-10-CM

## 2023-02-13 DIAGNOSIS — R94.39: ICD-10-CM

## 2023-02-13 PROCEDURE — 93017 CV STRESS TEST TRACING ONLY: CPT

## 2023-02-13 PROCEDURE — 78452 HT MUSCLE IMAGE SPECT MULT: CPT

## 2023-02-13 RX ADMIN — Medication PRN ML: at 09:16

## 2023-02-13 RX ADMIN — Medication PRN ML: at 09:31

## 2023-02-13 NOTE — CARDIOLOGY STRESS TEST REPORT
Stress Test Report


Date of Procedure/Referring:


Date of Procedure:  Feb 13, 2023


PCP


Emelyn Myers MD


Admitting Physician


Admitting Physician:


 








Attending Physician:


Ravi Moise MD





Indications:


HTN





Baseline Heart Rate:


64





Baseline Blood Pressure:


Blood Pressure Systolic:  124


Blood Pressure Diastolic:  45


Baseline Vitals





Vital Signs








  Date Time  Temp Pulse Resp B/P (MAP) Pulse Ox O2 Delivery O2 Flow Rate FiO2


 


2/13/23 09:12  74 20 124/45 (71) 97 Room Air  











Baseline EKG:


Baseline EKG:  NSR





Summary


After explaining the procedure to the patient, she  signed a consent and then 

brought to the stress nuclear laboratory.


Patient received 0.4 mg Lexiscan for stress test, ECG, heart rate and blood 

pressure were monitored continuously.  Resting and stress dose of radio tracer 

were injected, imaging was acquired and reviewed in short axis, horizontal long 

axis and vertical long axis views.


TID:  1


SSS:  6


SDS:  6


EF:  80


Patient tolerated Lexiscan well


Extracardiac attenuation with vertical cardiac axis, reversible ischemia 

involving the anterior wall and some reversibility of the inferior wall


Normal left ventricular size, ejection fraction 80%





Copy


Copies To 1:   EMELYN MYERS MD, BASHAR J MD              Feb 13, 2023 11:48

## 2023-02-22 ENCOUNTER — HOSPITAL ENCOUNTER (OUTPATIENT)
Dept: HOSPITAL 75 - CATH | Age: 73
Discharge: HOME | End: 2023-02-22
Attending: INTERNAL MEDICINE
Payer: MEDICARE

## 2023-02-22 VITALS — SYSTOLIC BLOOD PRESSURE: 155 MMHG | DIASTOLIC BLOOD PRESSURE: 66 MMHG

## 2023-02-22 VITALS — DIASTOLIC BLOOD PRESSURE: 82 MMHG | SYSTOLIC BLOOD PRESSURE: 141 MMHG

## 2023-02-22 VITALS — DIASTOLIC BLOOD PRESSURE: 80 MMHG | SYSTOLIC BLOOD PRESSURE: 171 MMHG

## 2023-02-22 VITALS — SYSTOLIC BLOOD PRESSURE: 147 MMHG | DIASTOLIC BLOOD PRESSURE: 80 MMHG

## 2023-02-22 VITALS — DIASTOLIC BLOOD PRESSURE: 69 MMHG | SYSTOLIC BLOOD PRESSURE: 155 MMHG

## 2023-02-22 VITALS — DIASTOLIC BLOOD PRESSURE: 82 MMHG | SYSTOLIC BLOOD PRESSURE: 154 MMHG

## 2023-02-22 VITALS — DIASTOLIC BLOOD PRESSURE: 74 MMHG | SYSTOLIC BLOOD PRESSURE: 150 MMHG

## 2023-02-22 VITALS — WEIGHT: 266.76 LBS | HEIGHT: 65.98 IN | BODY MASS INDEX: 42.87 KG/M2

## 2023-02-22 VITALS — DIASTOLIC BLOOD PRESSURE: 81 MMHG | SYSTOLIC BLOOD PRESSURE: 150 MMHG

## 2023-02-22 DIAGNOSIS — E78.5: ICD-10-CM

## 2023-02-22 DIAGNOSIS — I25.10: Primary | ICD-10-CM

## 2023-02-22 DIAGNOSIS — I65.23: ICD-10-CM

## 2023-02-22 DIAGNOSIS — R00.2: ICD-10-CM

## 2023-02-22 DIAGNOSIS — I10: ICD-10-CM

## 2023-02-22 DIAGNOSIS — Z79.899: ICD-10-CM

## 2023-02-22 LAB
ALBUMIN SERPL-MCNC: 4.2 GM/DL (ref 3.2–4.5)
ALP SERPL-CCNC: 70 U/L (ref 40–136)
ALT SERPL-CCNC: 14 U/L (ref 0–55)
APTT BLD: 37 SEC (ref 24–35)
APTT PPP: YELLOW S
BACTERIA #/AREA URNS HPF: NEGATIVE /HPF
BILIRUB SERPL-MCNC: 0.4 MG/DL (ref 0.1–1)
BILIRUB UR QL STRIP: NEGATIVE
BUN/CREAT SERPL: 14
CALCIUM SERPL-MCNC: 9.2 MG/DL (ref 8.5–10.1)
CHLORIDE SERPL-SCNC: 111 MMOL/L (ref 98–107)
CHOLEST SERPL-MCNC: 176 MG/DL (ref ?–200)
CO2 SERPL-SCNC: 23 MMOL/L (ref 21–32)
CREAT SERPL-MCNC: 0.81 MG/DL (ref 0.6–1.3)
FIBRINOGEN PPP-MCNC: CLEAR MG/DL
GFR SERPLBLD BASED ON 1.73 SQ M-ARVRAT: 76 ML/MIN
GLUCOSE SERPL-MCNC: 106 MG/DL (ref 70–105)
GLUCOSE UR STRIP-MCNC: NEGATIVE MG/DL
HCT VFR BLD CALC: 38 % (ref 35–52)
HDLC SERPL-MCNC: 48 MG/DL (ref 40–60)
HGB BLD-MCNC: 12.8 G/DL (ref 11.5–16)
INR PPP: 0.9 (ref 0.8–1.4)
KETONES UR QL STRIP: NEGATIVE
LEUKOCYTE ESTERASE UR QL STRIP: NEGATIVE
MCH RBC QN AUTO: 30 PG (ref 25–34)
MCHC RBC AUTO-ENTMCNC: 33 G/DL (ref 32–36)
MCV RBC AUTO: 89 FL (ref 80–99)
NITRITE UR QL STRIP: NEGATIVE
PH UR STRIP: 6 [PH] (ref 5–9)
PLATELET # BLD: 241 10^3/UL (ref 130–400)
PMV BLD AUTO: 10.4 FL (ref 9–12.2)
POTASSIUM SERPL-SCNC: 4 MMOL/L (ref 3.6–5)
PROT SERPL-MCNC: 7 GM/DL (ref 6.4–8.2)
PROT UR QL STRIP: NEGATIVE
PROTHROMBIN TIME: 13.1 SEC (ref 12.2–14.7)
RBC #/AREA URNS HPF: (no result) /HPF
SODIUM SERPL-SCNC: 142 MMOL/L (ref 135–145)
SP GR UR STRIP: 1.01 (ref 1.02–1.02)
SQUAMOUS #/AREA URNS HPF: (no result) /HPF
TRIGL SERPL-MCNC: 87 MG/DL (ref ?–150)
VLDLC SERPL CALC-MCNC: 17 MG/DL (ref 5–40)
WBC # BLD AUTO: 7.7 10^3/UL (ref 4.3–11)
WBC #/AREA URNS HPF: (no result) /HPF

## 2023-02-22 PROCEDURE — 93458 L HRT ARTERY/VENTRICLE ANGIO: CPT

## 2023-02-22 PROCEDURE — 36415 COLL VENOUS BLD VENIPUNCTURE: CPT

## 2023-02-22 PROCEDURE — 93005 ELECTROCARDIOGRAM TRACING: CPT

## 2023-02-22 PROCEDURE — 81000 URINALYSIS NONAUTO W/SCOPE: CPT

## 2023-02-22 PROCEDURE — 87081 CULTURE SCREEN ONLY: CPT

## 2023-02-22 PROCEDURE — 85730 THROMBOPLASTIN TIME PARTIAL: CPT

## 2023-02-22 PROCEDURE — 85610 PROTHROMBIN TIME: CPT

## 2023-02-22 PROCEDURE — 85027 COMPLETE CBC AUTOMATED: CPT

## 2023-02-22 PROCEDURE — 71045 X-RAY EXAM CHEST 1 VIEW: CPT

## 2023-02-22 PROCEDURE — 80053 COMPREHEN METABOLIC PANEL: CPT

## 2023-02-22 PROCEDURE — 80061 LIPID PANEL: CPT

## 2023-02-22 NOTE — DISCHARGE INST-POST CATH
Discharge Inst-CATH/EP


Problems Reviewed?:  Yes


Post Cardiac Cath/EP D/C Inst


Follow Up/Plan


Appointment with Dr. Moise's office in 2 to 4 weeks


<b>CARDIAC CATH/EP PROCEDURE DISCHARGE INSTRUCTIONS</b>





ACTIVITY





* Go Home directly and rest.


* Limit activity of the leg (or wrist if it was used) for 7 days including aer

obics, swimming,


   jogging, bicycling, etc.


* Restrict stair-climbing for 7 days if possible, if not, climb up with your 

non-cath leg, then


   bring together on the same step.


* Avoid lifting, pushing, pulling or excessive movement of the affected extremi

ty for 7 days.


* Customary sexual activity may be resumed after 2 days-use caution not to use a

position  


   that strains or causes pain to the affected extremity.


* No driving for 24 hours.


* NO SMOKING. 


* Avoid straining for bowel movements for 7 days.


* Gentle walking on level ground is allowed.


* Returning to work will depend on the type of procedure and the results. Your 

doctor will discuss


   this with you.





CALL YOUR DOCTOR FOR ANY OF THE FOLLOWING:





*If bleeding from the puncture site occurs- Apply gentle pressure to site with 

clean cloth and call


   your doctor or EMS.


* If a knot or lump forms under the skin, increases in size, or causes pain.


* If bruising appears to be worsening or moving further down your leg instead of

disappearing.


* Temperature above 101 F.





CARE OF YOUR GROIN INCISION;





* Bruising or purple discoloration of the skin near the puncture site is common.


* You may shower only, no bathtub bathing for 5 days.  Be careful to avoid 

slipping as your


   leg may feel stiff.


* If a closure device was used on your femoral artery, please see the attached 

guide regarding


   care of the device and your leg.


* Leave dressing on FOR 24 hours.





CARE OF YOUR WRIST INCISION;





* Bruising or purple discoloration of the skin near the puncture site is common.


* You may shower.


* DO NOT submerge wrist.


* Leave dressing on FOR 24 hours.











PAOLA MOISE MD              Feb 22, 2023 12:02

## 2023-02-22 NOTE — CARDIAC PROCEDURE NOTE-CS/ASA
Pre-Procedure Note


Pre-Op Procedure Note


Date of Available H&P:  Feb 16, 2023


Date H&P Reviewed:  Feb 22, 2023


Time H&P Reviewed:  11:24


History & Physical:  H&P Reviewed, Patient Examed, No changes noted


Pre-Operative Diagnosis:  CAD





Conscious Sedation Pre-Proced


Time


11:24





ASA Score


3


For ASA 3 and 4: Consider anesthesia and medical clearance. Also, for patients

with a history of failed moderate sedation consider anesthesia.

















Airway 


 


Lungs 


 


Heart 


 


 ASA score


 


 ASA 1: a normal healthy patient


 


 ASA 2:  a patient with a mild systemic disease (mid diabetes, controlled 

hypertension, obesity 


 


 ASA 3:  a patient with a severe systemic disease that limits activity  (angina,

COPD, prior Myocardial infarction)


 


 ASA 4:  a patient with an incapacitating disease that is a constant threat to 

life (CHF, renal failure)


 


 ASA 5:  a moribund patient not expected to survive 24 hrs.  (ruptured aneurysm)


 


 ASA 6:  a declared brain-dead patient whose organs are being harvested.


 


 For emergent operations, add the letter E after the classification











Mallampati Classification


Grade 3





Sedation Plan


Analgesia, Amnesia, Plan communicated to team members, Discussed options with 

patient/fam, Discussed risks with patient/fam


The patient is an appropriate candidate to undergo the planned procedure, 

sedation, and anesthesia.





The patient immediately re-assessed prior to indication.











PAOLA PELLETIER MD              Feb 22, 2023 11:24

## 2023-02-22 NOTE — DIAGNOSTIC IMAGING REPORT
EXAMINATION: 

Chest, 1 view.



HISTORY: 

Abnormal stress test.



COMPARISON: 

01/26/2023.



FINDINGS: 

The lung volumes are normal. No focal consolidation is seen. No

large pleural effusion or pneumothorax is seen. The

cardiomediastinal silhouette is normal in size and contour. No

acute osseous abnormality is seen.



IMPRESSION: 

No acute pleuroparenchymal process.



Dictated by: 



  Dictated on workstation # [id number]

## 2023-02-22 NOTE — CARDIAC CATH REPORT
Cardiac Cath Report


Physician (s)/Assistant (s)


Physician


PAOLA PELLETIER MD





Pre-Procedure Diagnosis


Pre-Procedure Diagnosis:  CAD





Post-Procedure Note


Procedure Start Date:  Feb 22, 2023


Name of Procedure:  


Left heart catheterization


Findings/Procedure Note


PROCEDURE NOTE:


72-year-old lady with hypertension, hyperlipidemia, had an abnormal stress test,

scheduled for cardiac catheterization possible PTCA.


After explaining the procedure to the patient, all pros and cons were explained,

all questions were answered.  The patient signed the consent and then she  was 

placed in the cardiac catheterization laboratory. Groin was prepped in SL 

fashion local anesthesia was used. Sheath placed in the right radial artery, 

Tiger catheter was advanced to the left ventricular cavity, pressure was 

measured, pullback LV to aorta was done, engage the right and left coronary 

system, multiple views were obtained.


At the end of the procedure the sheath was removed.  Vascular band was used





FINDINGS:





Hemodynamics 


/6, end-diastolic pressure of 6


Aorta 125/64 mean of 92





ANATOMY:


Left Main is free of obstructive disease


Left Anterior Descending has mild disease nonobstructive disease


Left Circumflex has mild disease nonobstructive disease


Right Coronary Artery is dominant artery with mild disease nonobstructive 

disease


LV Gram was not done, pressure was measured





CONCLUSION:


Mild coronary artery disease nonobstructive disease


Normal left ventricular end-diastolic pressure





DISCUSSION AND RECOMMENDATION:


Abnormal stress test is probably due to extracardiac attenuation, medical 

therapy is recommended


Anesthesia Type:  Conscious Sedation


Estimated blood loss (mL):  10 ml


Contrast Amount:  25 ml


Total Radiation Dose:  205 mGy





Post-Procedure Diagnosis


Post-operative diagnosis:  


Coronary artery disease


Hypertension


Hyperlipidemia











PAOLA PELLETIER MD              Feb 22, 2023 12:05